# Patient Record
Sex: FEMALE | Race: WHITE | NOT HISPANIC OR LATINO | URBAN - METROPOLITAN AREA
[De-identification: names, ages, dates, MRNs, and addresses within clinical notes are randomized per-mention and may not be internally consistent; named-entity substitution may affect disease eponyms.]

---

## 2024-07-18 ENCOUNTER — LAB OUTSIDE AN ENCOUNTER (OUTPATIENT)
Dept: URBAN - METROPOLITAN AREA CLINIC 19 | Facility: CLINIC | Age: 24
End: 2024-07-18

## 2024-07-18 ENCOUNTER — OFFICE VISIT (OUTPATIENT)
Dept: URBAN - METROPOLITAN AREA CLINIC 19 | Facility: CLINIC | Age: 24
End: 2024-07-18
Payer: COMMERCIAL

## 2024-07-18 ENCOUNTER — DASHBOARD ENCOUNTERS (OUTPATIENT)
Age: 24
End: 2024-07-18

## 2024-07-18 VITALS
BODY MASS INDEX: 31.4 KG/M2 | WEIGHT: 177.2 LBS | SYSTOLIC BLOOD PRESSURE: 102 MMHG | TEMPERATURE: 98.6 F | HEIGHT: 63 IN | HEART RATE: 73 BPM | DIASTOLIC BLOOD PRESSURE: 64 MMHG

## 2024-07-18 DIAGNOSIS — K52.89 (LYMPHOCYTIC) MICROSCOPIC COLITIS: ICD-10-CM

## 2024-07-18 DIAGNOSIS — R11.2 NAUSEA AND VOMITING, UNSPECIFIED VOMITING TYPE: ICD-10-CM

## 2024-07-18 PROCEDURE — 99204 OFFICE O/P NEW MOD 45 MIN: CPT | Performed by: INTERNAL MEDICINE

## 2024-07-18 RX ORDER — PROMETHAZINE HYDROCHLORIDE 25 MG/1
1 TABLET AS NEEDED TABLET ORAL EVERY 6 HOURS
Qty: 40 TABLET | Refills: 1 | OUTPATIENT
Start: 2024-07-18 | End: 2024-08-07

## 2024-07-18 RX ORDER — ONDANSETRON 4 MG/1
1 TABLET ON THE TONGUE AND ALLOW TO DISSOLVE TABLET, ORALLY DISINTEGRATING ORAL
Qty: 40 | Refills: 3 | OUTPATIENT
Start: 2024-07-18

## 2024-07-18 RX ORDER — PANTOPRAZOLE SODIUM 40 MG/1
1 TABLET TABLET, DELAYED RELEASE ORAL ONCE A DAY
Status: ACTIVE | COMMUNITY

## 2024-07-18 NOTE — HPI-TODAY'S VISIT:
Patient, who is a  and has a history of anxiety, depression, and migraine headaches, presents as a referral from the [facility] ED, where she was seen on 5/28/24. At that time, she was complaining mainly of vomiting. She stated that she was waking up every morning with nausea/vomiting and diarrhea for over a month. It was worse when she would eat, and she associated it with abdominal pain. Labs, RUQ ultrasound, and abdominal x-ray were all unremarkable. She was given famotidine, viscous lidocaine, IV fluids, Maalox, and ondansetron in the ED and told to follow up with a gastroenterologist. She is taking pantoprazole 40 mg po daily and has noticed some improvement. However, she still has sporadic nausea and upper abdominal pain. Also has bowel urgency. Symptoms can last up to an hour. She does not associated these symptoms with her migraine headaches, but she has had more of these headaches lately. Of note, she described her bowel urgency purely as a feeling like she needs to go to the bathroom; she was not having loose stools and denied any blood or mucus in the stool. If anything, she is prone to constipation. She has some ondansetron, but it makes her sleepy - we agreed to try promethazine, although it is probably going to cause the same issue.

## 2024-07-19 LAB
ALBUMIN: 5
ALKALINE PHOSPHATASE: 60
ALT (SGPT): 11
AMYLASE: 53
AST (SGOT): 14
BILIRUBIN, TOTAL: 0.6
BUN/CREATININE RATIO: 10
BUN: 11
C-REACTIVE PROTEIN, QUANT: <1
CALCIUM: 10.3
CARBON DIOXIDE, TOTAL: 19
CHLORIDE: 102
CREATININE: 1.08
EGFR: 73
GLOBULIN, TOTAL: 2.2
GLUCOSE: 76
HEMATOCRIT: 42.9
HEMOGLOBIN: 13.6
LIPASE: 46
MCH: 28.5
MCHC: 31.7
MCV: 90
NRBC: (no result)
PLATELETS: 368
POTASSIUM: 4.4
PROTEIN, TOTAL: 7.2
RBC: 4.78
RDW: 13
SEDIMENTATION RATE-WESTERGREN: 28
SODIUM: 140
T4,FREE(DIRECT): 1.52
TSH: 3.27
WBC: 9

## 2024-07-25 LAB
DEAMIDATED GLIADIN ABS, IGA: 3
DEAMIDATED GLIADIN ABS, IGG: 2
IMMUNOGLOBULIN A, QN, SERUM: 155
T-TRANSGLUTAMINASE (TTG) IGA: <2
T-TRANSGLUTAMINASE (TTG) IGG: <2
WRITTEN AUTHORIZATION: (no result)

## 2024-08-02 ENCOUNTER — LAB OUTSIDE AN ENCOUNTER (OUTPATIENT)
Dept: URBAN - METROPOLITAN AREA CLINIC 19 | Facility: CLINIC | Age: 24
End: 2024-08-02

## 2024-08-07 ENCOUNTER — CLAIMS CREATED FROM THE CLAIM WINDOW (OUTPATIENT)
Dept: URBAN - METROPOLITAN AREA SURGERY CENTER 31 | Facility: SURGERY CENTER | Age: 24
End: 2024-08-07
Payer: COMMERCIAL

## 2024-08-07 DIAGNOSIS — R10.10 UPPER ABDOMINAL PAIN: ICD-10-CM

## 2024-08-07 DIAGNOSIS — R10.10 ABDOMINAL WALL PAIN IN RIGHT UPPER QUADRANT: ICD-10-CM

## 2024-08-07 PROCEDURE — 00731 ANES UPR GI NDSC PX NOS: CPT | Performed by: NURSE ANESTHETIST, CERTIFIED REGISTERED

## 2024-08-07 PROCEDURE — 43235 EGD DIAGNOSTIC BRUSH WASH: CPT | Performed by: INTERNAL MEDICINE

## 2024-08-07 RX ORDER — ONDANSETRON 4 MG/1
1 TABLET ON THE TONGUE AND ALLOW TO DISSOLVE TABLET, ORALLY DISINTEGRATING ORAL
Qty: 40 | Refills: 3 | Status: ACTIVE | COMMUNITY
Start: 2024-07-18

## 2024-08-07 RX ORDER — PANTOPRAZOLE SODIUM 40 MG/1
1 TABLET TABLET, DELAYED RELEASE ORAL ONCE A DAY
Status: ACTIVE | COMMUNITY

## 2024-08-07 RX ORDER — PROMETHAZINE HYDROCHLORIDE 25 MG/1
1 TABLET AS NEEDED TABLET ORAL EVERY 6 HOURS
Qty: 40 TABLET | Refills: 1 | Status: ACTIVE | COMMUNITY
Start: 2024-07-18 | End: 2024-08-07

## 2024-08-16 ENCOUNTER — OFFICE VISIT (OUTPATIENT)
Dept: URBAN - METROPOLITAN AREA CLINIC 19 | Facility: CLINIC | Age: 24
End: 2024-08-16

## 2024-11-05 ENCOUNTER — OFFICE VISIT (OUTPATIENT)
Dept: URBAN - METROPOLITAN AREA CLINIC 19 | Facility: CLINIC | Age: 24
End: 2024-11-05
Payer: COMMERCIAL

## 2024-11-05 VITALS
DIASTOLIC BLOOD PRESSURE: 76 MMHG | HEIGHT: 63 IN | OXYGEN SATURATION: 100 % | WEIGHT: 179 LBS | HEART RATE: 70 BPM | SYSTOLIC BLOOD PRESSURE: 105 MMHG | BODY MASS INDEX: 31.71 KG/M2 | TEMPERATURE: 99.3 F

## 2024-11-05 DIAGNOSIS — R12 HEARTBURN: ICD-10-CM

## 2024-11-05 DIAGNOSIS — R11.2 NAUSEA AND VOMITING, UNSPECIFIED VOMITING TYPE: ICD-10-CM

## 2024-11-05 DIAGNOSIS — Z90.49 S/P CHOLECYSTECTOMY: ICD-10-CM

## 2024-11-05 PROCEDURE — 99213 OFFICE O/P EST LOW 20 MIN: CPT | Performed by: NURSE PRACTITIONER

## 2024-11-05 RX ORDER — ONDANSETRON 4 MG/1
1 TABLET ON THE TONGUE AND ALLOW TO DISSOLVE TABLET, ORALLY DISINTEGRATING ORAL
Qty: 40 | Refills: 3 | Status: ACTIVE | COMMUNITY
Start: 2024-07-18

## 2024-11-05 RX ORDER — PANTOPRAZOLE SODIUM 40 MG/1
1 TABLET TABLET, DELAYED RELEASE ORAL ONCE A DAY
Qty: 90 TABLET | Refills: 1 | OUTPATIENT
Start: 2024-11-05

## 2024-11-05 RX ORDER — ACETAMINOPHEN 325 MG/1
1 TABLET AS NEEDED TABLET, FILM COATED ORAL
Status: ACTIVE | COMMUNITY

## 2024-11-05 RX ORDER — ONDANSETRON 4 MG/1
1 TABLET ON THE TONGUE AND ALLOW TO DISSOLVE TABLET, ORALLY DISINTEGRATING ORAL
Qty: 40 | Refills: 3
Start: 2024-07-18

## 2024-11-05 RX ORDER — PANTOPRAZOLE SODIUM 40 MG/1
1 TABLET TABLET, DELAYED RELEASE ORAL ONCE A DAY
Status: ACTIVE | COMMUNITY

## 2024-11-05 NOTE — PHYSICAL EXAM GASTROINTESTINAL
Abdomen soft, nontender, nondistended, slight bruising to mid lower abdomen, incisions intact, no guarding or rigidity, no hepatosplenomegaly

## 2024-11-05 NOTE — HPI-TODAY'S VISIT:
7/18/2024 (Dr. Lilly): Patient, who is a  and has a history of anxiety, depression, and migraine headaches, presents as a referral from the [facility] ED, where she was seen on 5/28/24. At that time, she was complaining mainly of vomiting. She stated that she was waking up every morning with nausea/vomiting and diarrhea for over a month. It was worse when she would eat, and she associated it with abdominal pain. Labs, RUQ ultrasound, and abdominal x-ray were all unremarkable. She was given famotidine, viscous lidocaine, IV fluids, Maalox, and ondansetron in the ED and told to follow up with a gastroenterologist. She is taking pantoprazole 40 mg po daily and has noticed some improvement. However, she still has sporadic nausea and upper abdominal pain. Also has bowel urgency. Symptoms can last up to an hour. She does not associated these symptoms with her migraine headaches, but she has had more of these headaches lately. Of note, she described her bowel urgency purely as a feeling like she needs to go to the bathroom; she was not having loose stools and denied any blood or mucus in the stool. If anything, she is prone to constipation. She has some ondansetron, but it makes her sleepy - we agreed to try promethazine, although it is probably going to cause the same issue.   Labs: CBC normal CMP unremarkable, TSH and free T4 normal celiac disease panel negative, CRP, lipase, amylase, ESR all normal   HIDA 8/2/2024 abnormal, EF 21% referred to Dr. Abeba Espinoza   8/7/2024 EGD with Dr. Lilly was normal.  No specimens collected   11/5/2024 (SAHIL Teixeira): She reports she underwent lap su with Dr. Espinoza on 11/1/2024. Had been taking oxycodone, makes her feel nauseated, worse during sleep with or without the pain med. Has f/u with her 11/14/2024. Has zofran to take PRN. And feels an overall discomfort. Able to eat again. LBM was this morning. Taking colace. Incisions healing well. Reports intermittent phantom pains to sides of her umbilicus. Continues on Pantoprazole. IBgard also helps her a lot.

## 2024-11-05 NOTE — PHYSICAL EXAM CONSTITUTIONAL:
Well developed, well nourished, in no acute distress, ambulating without difficulty, normal communication ability.

## 2025-02-18 ENCOUNTER — OFFICE VISIT (OUTPATIENT)
Dept: URBAN - METROPOLITAN AREA CLINIC 19 | Facility: CLINIC | Age: 25
End: 2025-02-18
Payer: COMMERCIAL

## 2025-02-18 VITALS
OXYGEN SATURATION: 98 % | WEIGHT: 170 LBS | SYSTOLIC BLOOD PRESSURE: 130 MMHG | TEMPERATURE: 97 F | HEART RATE: 110 BPM | BODY MASS INDEX: 30.12 KG/M2 | DIASTOLIC BLOOD PRESSURE: 80 MMHG | HEIGHT: 63 IN

## 2025-02-18 DIAGNOSIS — R11.2 NAUSEA AND VOMITING, UNSPECIFIED VOMITING TYPE: ICD-10-CM

## 2025-02-18 DIAGNOSIS — K90.89 BILE SALT-INDUCED DIARRHEA: ICD-10-CM

## 2025-02-18 DIAGNOSIS — K52.9 CHRONIC DIARRHEA: ICD-10-CM

## 2025-02-18 PROCEDURE — 99214 OFFICE O/P EST MOD 30 MIN: CPT | Performed by: INTERNAL MEDICINE

## 2025-02-18 RX ORDER — PANTOPRAZOLE SODIUM 40 MG/1
1 TABLET TABLET, DELAYED RELEASE ORAL ONCE A DAY
Qty: 90 TABLET | Refills: 1 | Status: DISCONTINUED | COMMUNITY
Start: 2024-11-05

## 2025-02-18 RX ORDER — PANTOPRAZOLE SODIUM 40 MG/1
1 TABLET TABLET, DELAYED RELEASE ORAL ONCE A DAY
Status: DISCONTINUED | COMMUNITY

## 2025-02-18 RX ORDER — ACETAMINOPHEN 325 MG/1
1 TABLET AS NEEDED TABLET, FILM COATED ORAL
Status: ACTIVE | COMMUNITY

## 2025-02-18 RX ORDER — ONDANSETRON 4 MG/1
1 TABLET ON THE TONGUE AND ALLOW TO DISSOLVE TABLET, ORALLY DISINTEGRATING ORAL
Qty: 40 | Refills: 3 | Status: ACTIVE | COMMUNITY
Start: 2024-07-18

## 2025-02-18 RX ORDER — CHOLESTYRAMINE 4 G/9G
1 PACKET MIXED WITH WATER OR NON-CARBONATED DRINK POWDER, FOR SUSPENSION ORAL ONCE A DAY
Qty: 30 | OUTPATIENT
Start: 2025-02-18

## 2025-02-18 NOTE — HPI-TODAY'S VISIT:
7/18/2024 (Dr. Dhiraj Lilly): Patient, who is a  and has a history of anxiety, depression, and migraine headaches, presents as a referral from the [facility] ED, where she was seen on 5/28/24. At that time, she was complaining mainly of vomiting. She stated that she was waking up every morning with nausea/vomiting and diarrhea for over a month. It was worse when she would eat, and she associated it with abdominal pain. Labs, RUQ ultrasound, and abdominal x-ray were all unremarkable. She was given famotidine, viscous lidocaine, IV fluids, Maalox, and ondansetron in the ED and told to follow up with a gastroenterologist. She is taking pantoprazole 40 mg po daily and has noticed some improvement. However, she still has sporadic nausea and upper abdominal pain. Also has bowel urgency. Symptoms can last up to an hour. She does not associated these symptoms with her migraine headaches, but she has had more of these headaches lately. Of note, she described her bowel urgency purely as a feeling like she needs to go to the bathroom; she was not having loose stools and denied any blood or mucus in the stool. If anything, she is prone to constipation. She has some ondansetron, but it makes her sleepy - we agreed to try promethazine, although it is probably going to cause the same issue.  Labs: CBC normal CMP unremarkable, TSH and free T4 normal celiac disease panel negative, CRP, lipase, amylase, ESR all normal  HIDA 8/2/2024 abnormal, EF 21% referred to Dr. Abeba Espinoza   8/7/2024 EGD with Dr. Lilly was normal.  No specimens collected  11/5/2024 (Lluvia Echavarria NP): She reports she underwent lap su with Dr. Espinoza on 11/1/2024. Had been taking oxycodone, makes her feel nauseated, worse during sleep with or without the pain med. Has f/u with her 11/14/2024. Has zofran to take PRN. And feels an overall discomfort. Able to eat again. LBM was this morning. Taking colace. Incisions healing well. Reports intermittent phantom pains to sides of her umbilicus. Continues on Pantoprazole. IBgard also helps her a lot.  2/18/25 (Dr. Dhiraj Lilly):  Patient presents for urgent consultation. As noted above, she had a cholecystectomy by Dr. Abeba Espinoza for biliary dyskinesia. She continues to have issues with nausea/vomiting and lack of appetite. She has a gagging sensation and is sensitive to meals. She has had some loose stools for the past 2 weeks along with dizziness. She admits to high anxiety/depression symptoms with some "brain fog" and hot flashes (?).  Patient admits that a lot of her symptoms are related to her stress, particularly when she is around people. She had severe gagging/retching around Thanksgiving, Kya, and New Year's. But she also has intermittent symptoms even when she is not around people. We discussed the need for her to see a counselor about her stress and a neurologist about her migraine headaches, which are better but not always controlled.

## 2025-02-19 LAB
(TTG) AB, IGA: <1
(TTG) AB, IGG: <1
ABSOLUTE BASOPHILS: 86
ABSOLUTE EOSINOPHILS: 76
ABSOLUTE LYMPHOCYTES: 2009
ABSOLUTE MONOCYTES: 745
ABSOLUTE NEUTROPHILS: 7884
ALBUMIN/GLOBULIN RATIO: 2.1
ALBUMIN: 5.5
ALKALINE PHOSPHATASE: 49
ALT: 41
ANTIGLIADIN ABS, IGA: <1
AST: 21
BASOPHILS: 0.8
BILIRUBIN, TOTAL: 0.8
BUN/CREATININE RATIO: 12
C-REACTIVE PROTEIN, QUANT: <3
CALCIUM: 10.5
CARBON DIOXIDE: 20
CHLORIDE: 103
CREATININE: 1.06
EOSINOPHILS: 0.7
GLIADIN (DEAMIDATED) AB (IGG): <1
GLOBULIN: 2.6
GLUCOSE: 76
HEMATOCRIT: 44.7
HEMOGLOBIN: 14.7
IMMUNOGLOBULIN A: 163
LYMPHOCYTES: 18.6
MCH: 28.5
MCHC: 32.9
MCV: 86.8
MONOCYTES: 6.9
MPV: 12.3
NEUTROPHILS: 73
PLATELET COUNT: 416
POTASSIUM: 3.9
PROTEIN, TOTAL: 8.1
RDW: 12.2
RED BLOOD CELL COUNT: 5.15
SED RATE BY MODIFIED: 6
SODIUM: 140
UREA NITROGEN (BUN): 13
WHITE BLOOD CELL COUNT: 10.8

## 2025-02-22 ENCOUNTER — TELEPHONE ENCOUNTER (OUTPATIENT)
Dept: URBAN - METROPOLITAN AREA CLINIC 19 | Facility: CLINIC | Age: 25
End: 2025-02-22

## 2025-02-22 ENCOUNTER — LAB OUTSIDE AN ENCOUNTER (OUTPATIENT)
Dept: URBAN - METROPOLITAN AREA CLINIC 19 | Facility: CLINIC | Age: 25
End: 2025-02-22

## 2025-03-03 ENCOUNTER — LAB OUTSIDE AN ENCOUNTER (OUTPATIENT)
Dept: URBAN - METROPOLITAN AREA CLINIC 19 | Facility: CLINIC | Age: 25
End: 2025-03-03

## 2025-03-12 ENCOUNTER — TELEPHONE ENCOUNTER (OUTPATIENT)
Dept: URBAN - METROPOLITAN AREA CLINIC 19 | Facility: CLINIC | Age: 25
End: 2025-03-12

## 2025-03-14 ENCOUNTER — TELEPHONE ENCOUNTER (OUTPATIENT)
Dept: URBAN - METROPOLITAN AREA CLINIC 19 | Facility: CLINIC | Age: 25
End: 2025-03-14

## 2025-03-14 ENCOUNTER — LAB OUTSIDE AN ENCOUNTER (OUTPATIENT)
Dept: URBAN - METROPOLITAN AREA CLINIC 19 | Facility: CLINIC | Age: 25
End: 2025-03-14

## 2025-03-16 ENCOUNTER — ERX REFILL RESPONSE (OUTPATIENT)
Dept: URBAN - METROPOLITAN AREA CLINIC 19 | Facility: CLINIC | Age: 25
End: 2025-03-16

## 2025-03-16 RX ORDER — CHOLESTYRAMINE 4 G/9G
1 PACKET MIXED WITH WATER OR NON-CARBONATED DRINK POWDER, FOR SUSPENSION ORAL ONCE A DAY
Qty: 30 | OUTPATIENT

## 2025-03-16 RX ORDER — CHOLESTYRAMINE 4 G/9G
TAKE 1 PACKET MIXED WITH WATER OR NON-CARBONATED DRINK BY MOUTH ONCE A DAY POWDER, FOR SUSPENSION ORAL
Qty: 90 PACKET | Refills: 1 | OUTPATIENT

## 2025-04-15 ENCOUNTER — OFFICE VISIT (OUTPATIENT)
Dept: URBAN - METROPOLITAN AREA CLINIC 19 | Facility: CLINIC | Age: 25
End: 2025-04-15
Payer: COMMERCIAL

## 2025-04-15 DIAGNOSIS — K90.89 BILE SALT-INDUCED DIARRHEA: ICD-10-CM

## 2025-04-15 DIAGNOSIS — R11.2 NAUSEA AND VOMITING, UNSPECIFIED VOMITING TYPE: ICD-10-CM

## 2025-04-15 DIAGNOSIS — K76.0 FATTY LIVER: ICD-10-CM

## 2025-04-15 PROCEDURE — 99214 OFFICE O/P EST MOD 30 MIN: CPT | Performed by: NURSE PRACTITIONER

## 2025-04-15 RX ORDER — ACETAMINOPHEN 325 MG/1
1 TABLET AS NEEDED TABLET, FILM COATED ORAL
Status: ACTIVE | COMMUNITY

## 2025-04-15 RX ORDER — COLESEVELAM HYDROCHLORIDE 625 MG/1
3 TABLETS WITH MEALS TABLET, COATED ORAL TWICE A DAY
Qty: 180 | Refills: 1 | OUTPATIENT
Start: 2025-04-15

## 2025-04-15 RX ORDER — PROCHLORPERAZINE 25 MG/1
1 TABLET SUPPOSITORY RECTAL
Qty: 40 TABLET | Refills: 1 | OUTPATIENT
Start: 2025-04-15 | End: 2025-05-05

## 2025-04-15 RX ORDER — ONDANSETRON 4 MG/1
1 TABLET ON THE TONGUE AND ALLOW TO DISSOLVE TABLET, ORALLY DISINTEGRATING ORAL
Qty: 40 | Refills: 3 | Status: ACTIVE | COMMUNITY
Start: 2024-07-18

## 2025-04-15 RX ORDER — CHOLESTYRAMINE 4 G/9G
TAKE 1 PACKET MIXED WITH WATER OR NON-CARBONATED DRINK BY MOUTH ONCE A DAY POWDER, FOR SUSPENSION ORAL
Qty: 90 PACKET | Refills: 1 | Status: ON HOLD | COMMUNITY

## 2025-04-15 NOTE — HPI-TODAY'S VISIT:
7/18/2024 (Dr. Dhiraj Lilly): Patient, who is a  and has a history of anxiety, depression, and migraine headaches, presents as a referral from the [facility] ED, where she was seen on 5/28/24. At that time, she was complaining mainly of vomiting. She stated that she was waking up every morning with nausea/vomiting and diarrhea for over a month. It was worse when she would eat, and she associated it with abdominal pain. Labs, RUQ ultrasound, and abdominal x-ray were all unremarkable. She was given famotidine, viscous lidocaine, IV fluids, Maalox, and ondansetron in the ED and told to follow up with a gastroenterologist. She is taking pantoprazole 40 mg po daily and has noticed some improvement. However, she still has sporadic nausea and upper abdominal pain. Also has bowel urgency. Symptoms can last up to an hour. She does not associated these symptoms with her migraine headaches, but she has had more of these headaches lately. Of note, she described her bowel urgency purely as a feeling like she needs to go to the bathroom; she was not having loose stools and denied any blood or mucus in the stool. If anything, she is prone to constipation. She has some ondansetron, but it makes her sleepy - we agreed to try promethazine, although it is probably going to cause the same issue.  Labs: CBC normal CMP unremarkable, TSH and free T4 normal celiac disease panel negative, CRP, lipase, amylase, ESR all normal  HIDA 8/2/2024 abnormal, EF 21% referred to Dr. Abeba Espinoza  8/7/2024 EGD with Dr. Lilly was normal. No specimens collected  11/5/2024 (Lluvia Echavarria NP): She reports she underwent lap su with Dr. Espinoza on 11/1/2024. Had been taking oxycodone, makes her feel nauseated, worse during sleep with or without the pain med. Has f/u with her 11/14/2024. Has zofran to take PRN. And feels an overall discomfort. Able to eat again. LBM was this morning. Taking colace. Incisions healing well. Reports intermittent phantom pains to sides of her umbilicus. Continues on Pantoprazole. IBgard also helps her a lot.  2/18/25 (Dr. Dhiraj Lilly): Patient presents for urgent consultation. As noted above, she had a cholecystectomy by Dr. Abeba Espinoza for biliary dyskinesia. She continues to have issues with nausea/vomiting and lack of appetite. She has a gagging sensation and is sensitive to meals. She has had some loose stools for the past 2 weeks along with dizziness. She admits to high anxiety/depression symptoms with some "brain fog" and hot flashes (?).  Patient admits that a lot of her symptoms are related to her stress, particularly when she is around people. She had severe gagging/retching around Thanksgiving, Kya, and New Year's. But she also has intermittent symptoms even when she is not around people. We discussed the need for her to see a counselor about her stress and a neurologist about her migraine headaches, which are better but not always controlled.    3/3/2025 RUQ US: Echogenic lesion in the left liver is indeterminate  may represent a hemangioma.  Consider abdominal MRI.  Nonobstructing right nephrolithiasis.  Tiny echogenic focus in the right kidney may represent a tiny angiomyolipoma  04/22/2025 MRI liver: There is some mild focal fatty infiltration along the anterior falciform ligament likely corresponding to the echogenic focus on ultrasound.  No suspicious hepatic lesions   4/15/2025 (SAHIL Teixeira): She c/o ongoing nausea with intemittent vomiting. Also c/o questran. Tastes awful, takes an hour to get it down, she is unsure if it works or not, moment she swallows it causes her nausea and wants to vomit and reports it triggers reflux. Otherwise does not have significant reflux. Stopped it a week ago and has not noticed much difference. Describes stool as soft like sausage about once/day. When she is sick can be mutlitple times/day. Wants to know what other equivalent she can take so she does not have to take the powder. Wants to do for nausea as she can not tolerate zofran or promethazine. Vapes Delta-8 recommended by her Psych Takes excedrin migraine once every few weeks. Denies ETOH.

## 2025-04-16 LAB
A/G RATIO: 2
ALBUMIN: 4.5
ALKALINE PHOSPHATASE: 56
ALT (SGPT): 66
AST (SGOT): 26
BILIRUBIN, TOTAL: 0.4
BUN/CREATININE RATIO: 11
BUN: 11
CALCIUM: 9.6
CARBON DIOXIDE, TOTAL: 26
CHLORIDE: 107
CREATININE: 1.04
EGFR: 76
GLOBULIN, TOTAL: 2.2
GLUCOSE: 92
HEMATOCRIT: 42.6
HEMOGLOBIN: 13.8
INR: 1.1
MCH: 28.3
MCHC: 32.4
MCV: 87.5
MPV: 12.8
PLATELET COUNT: 370
POTASSIUM: 4.4
PROTEIN, TOTAL: 6.7
PT: 11.3
RDW: 12.8
RED BLOOD CELL COUNT: 4.87
SODIUM: 140
WHITE BLOOD CELL COUNT: 5.8

## 2025-04-22 ENCOUNTER — TELEPHONE ENCOUNTER (OUTPATIENT)
Dept: URBAN - METROPOLITAN AREA CLINIC 19 | Facility: CLINIC | Age: 25
End: 2025-04-22

## 2025-04-22 RX ORDER — COLESEVELAM HYDROCHLORIDE 625 MG/1
3 TABLETS WITH MEALS TABLET, COATED ORAL TWICE A DAY
Qty: 180 | Refills: 1
Start: 2025-04-15

## 2025-04-22 RX ORDER — PROCHLORPERAZINE 25 MG/1
1 TABLET SUPPOSITORY RECTAL
Qty: 40 TABLET | Refills: 1
Start: 2025-04-15 | End: 2025-05-12

## 2025-04-24 ENCOUNTER — TELEPHONE ENCOUNTER (OUTPATIENT)
Dept: URBAN - METROPOLITAN AREA CLINIC 19 | Facility: CLINIC | Age: 25
End: 2025-04-24

## 2025-04-30 ENCOUNTER — LAB OUTSIDE AN ENCOUNTER (OUTPATIENT)
Dept: URBAN - METROPOLITAN AREA CLINIC 19 | Facility: CLINIC | Age: 25
End: 2025-04-30

## 2025-04-30 ENCOUNTER — OFFICE VISIT (OUTPATIENT)
Dept: URBAN - METROPOLITAN AREA CLINIC 19 | Facility: CLINIC | Age: 25
End: 2025-04-30

## 2025-04-30 RX ORDER — ACETAMINOPHEN 325 MG/1
1 TABLET AS NEEDED TABLET, FILM COATED ORAL
Status: DISCONTINUED | COMMUNITY

## 2025-04-30 RX ORDER — ONDANSETRON 4 MG/1
1 TABLET ON THE TONGUE AND ALLOW TO DISSOLVE TABLET, ORALLY DISINTEGRATING ORAL
Qty: 40 | Refills: 3 | Status: ACTIVE | COMMUNITY
Start: 2024-07-18

## 2025-04-30 RX ORDER — CHOLESTYRAMINE 4 G/9G
TAKE 1 PACKET MIXED WITH WATER OR NON-CARBONATED DRINK BY MOUTH ONCE A DAY POWDER, FOR SUSPENSION ORAL
Qty: 90 PACKET | Refills: 1 | Status: DISCONTINUED | COMMUNITY

## 2025-04-30 RX ORDER — COLESEVELAM HYDROCHLORIDE 625 MG/1
3 TABLETS WITH MEALS TABLET, COATED ORAL TWICE A DAY
Qty: 180 | Refills: 1 | Status: DISCONTINUED | COMMUNITY
Start: 2025-04-15

## 2025-04-30 RX ORDER — PROCHLORPERAZINE 25 MG/1
1 TABLET SUPPOSITORY RECTAL
Qty: 40 TABLET | Refills: 1 | Status: DISCONTINUED | COMMUNITY
Start: 2025-04-15 | End: 2025-05-12

## 2025-04-30 RX ORDER — SUCRALFATE 1 G/1
1 TABLET ON AN EMPTY STOMACH TABLET ORAL TWICE A DAY
Status: ACTIVE | COMMUNITY

## 2025-04-30 NOTE — HPI-TODAY'S VISIT:
Magui is a 25-year-old female with history of anxiety/depression, PTSD, and migraines who returns today for ER follow-up.  Last seen in clinic by SAHIL Teixeira on 4/15/2025 with ongoing nausea and intermittent vomiting.  At that time she reported her psychiatrist recommended to vape delta 8.  She was given Compazine 5 mg as needed for nausea, recommended to refrain from smoking delta 8 (given cannabinoid hyperemesis syndrome handout) and started on Colesevelam for treatment of diarrhea.  She presented to [facility] ER on 4/23/2025 with intermittent bouts of epigastric pain and nausea ongoing for a couple of months and worsening over the past couple of days.  CBC was unremarkable, creatinine 1.1 albumin 5.5, ALT 64.  RUQ ultrasound noted a previous CCY otherwise unremarkable.  She was given a prescription for Carafate, antiemetics and recommended to follow outpatient.  Reports epigastric tightening/pain causes her  to "violently vomit". She typically gets nauseous by noon and realized greasy/oily foods worsens symptom so she avoids them. No longer vaping Delta 8 since last office visit. Having [place] 4 to 7 stools.   Previous history summarized below: [facility] ED on 5/28/2024.  With complaints of vomiting. Labs, RUQ ultrasound, and abdominal x-ray were all unremarkable. She was given famotidine, viscous lidocaine, IV fluids, Maalox, and ondansetron in the ED. HIDA 8/2/2024 abnormal, EF 21% referred to Dr. Abeba Espinoza. Lap su on 11/1/2024 with Dr. Espinoza. EGD was on 8/2024 and was unremarkable with no specimens collected. RUQ US 3/3/2025 Echogenic lesion in the left liver is indeterminate may represent a hemangioma. Consider abdominal MRI. Nonobstructing right nephrolithiasis. Tiny echogenic focus in the right kidney may represent a tiny angiomyolipoma. MRI liver 04/22/2025: There is some mild focal fatty infiltration along the anterior falciform ligament likely corresponding to the echogenic focus on ultrasound. No suspicious hepatic lesions.

## 2025-05-09 ENCOUNTER — CLAIMS CREATED FROM THE CLAIM WINDOW (OUTPATIENT)
Dept: URBAN - METROPOLITAN AREA SURGERY CENTER 31 | Facility: SURGERY CENTER | Age: 25
End: 2025-05-09
Payer: COMMERCIAL

## 2025-05-09 DIAGNOSIS — K29.80 DUODENITIS: ICD-10-CM

## 2025-05-09 DIAGNOSIS — R11.2 ACUTE NAUSEA WITH NONBILIOUS VOMITING: ICD-10-CM

## 2025-05-09 DIAGNOSIS — R63.4 ABNORMAL INTENTIONAL WEIGHT LOSS: ICD-10-CM

## 2025-05-09 DIAGNOSIS — K29.70 GASTRITIS WITHOUT BLEEDING, UNSPECIFIED CHRONICITY, UNSPECIFIED GASTRITIS TYPE: ICD-10-CM

## 2025-05-09 DIAGNOSIS — K29.80 ACUTE DUODENITIS: ICD-10-CM

## 2025-05-09 DIAGNOSIS — F32.A DEPRESSION, UNSPECIFIED: ICD-10-CM

## 2025-05-09 DIAGNOSIS — F41.9 ANXIETY DISORDER, UNSPECIFIED: ICD-10-CM

## 2025-05-09 DIAGNOSIS — K29.60 ADENOPAPILLOMATOSIS GASTRICA: ICD-10-CM

## 2025-05-09 PROCEDURE — 00731 ANES UPR GI NDSC PX NOS: CPT | Performed by: NURSE ANESTHETIST, CERTIFIED REGISTERED

## 2025-05-09 PROCEDURE — 43239 EGD BIOPSY SINGLE/MULTIPLE: CPT | Performed by: STUDENT IN AN ORGANIZED HEALTH CARE EDUCATION/TRAINING PROGRAM

## 2025-05-09 RX ORDER — SUCRALFATE 1 G/1
1 TABLET ON AN EMPTY STOMACH TABLET ORAL TWICE A DAY
Status: ACTIVE | COMMUNITY

## 2025-05-09 RX ORDER — ONDANSETRON 4 MG/1
1 TABLET ON THE TONGUE AND ALLOW TO DISSOLVE TABLET, ORALLY DISINTEGRATING ORAL
Qty: 40 | Refills: 3 | Status: ACTIVE | COMMUNITY
Start: 2024-07-18

## 2025-05-14 ENCOUNTER — LAB OUTSIDE AN ENCOUNTER (OUTPATIENT)
Dept: URBAN - METROPOLITAN AREA CLINIC 19 | Facility: CLINIC | Age: 25
End: 2025-05-14

## 2025-05-18 ENCOUNTER — ERX REFILL RESPONSE (OUTPATIENT)
Dept: URBAN - METROPOLITAN AREA CLINIC 19 | Facility: CLINIC | Age: 25
End: 2025-05-18

## 2025-05-18 RX ORDER — COLESEVELAM HYDROCHLORIDE 625 MG/1
3 TABLETS WITH MEALS TABLET, COATED ORAL TWICE A DAY
Qty: 540 TABLET | Refills: 3 | OUTPATIENT

## 2025-05-19 ENCOUNTER — TELEPHONE ENCOUNTER (OUTPATIENT)
Dept: URBAN - METROPOLITAN AREA CLINIC 19 | Facility: CLINIC | Age: 25
End: 2025-05-19

## 2025-05-30 ENCOUNTER — OFFICE VISIT (OUTPATIENT)
Dept: URBAN - METROPOLITAN AREA CLINIC 19 | Facility: CLINIC | Age: 25
End: 2025-05-30
Payer: COMMERCIAL

## 2025-05-30 DIAGNOSIS — K76.0 FATTY LIVER: ICD-10-CM

## 2025-05-30 DIAGNOSIS — K29.90 GASTRITIS AND DUODENITIS: ICD-10-CM

## 2025-05-30 DIAGNOSIS — K31.84 GASTROPARESIS: ICD-10-CM

## 2025-05-30 PROCEDURE — 99214 OFFICE O/P EST MOD 30 MIN: CPT

## 2025-05-30 RX ORDER — SUCRALFATE 1 G/1
1 TABLET ON AN EMPTY STOMACH TABLET ORAL TWICE A DAY
Status: ACTIVE | COMMUNITY

## 2025-05-30 RX ORDER — COLESEVELAM HYDROCHLORIDE 625 MG/1
3 TABLETS WITH MEALS TABLET, COATED ORAL TWICE A DAY
Qty: 540 TABLET | Refills: 3 | Status: ACTIVE | COMMUNITY

## 2025-05-30 RX ORDER — PANTOPRAZOLE SODIUM 20 MG/1
1 TABLET 30 MINUTES BEFORE BREAKFAST TABLET, DELAYED RELEASE ORAL ONCE A DAY
Qty: 60 | Refills: 0 | OUTPATIENT
Start: 2025-05-29

## 2025-05-30 RX ORDER — FAMOTIDINE 20 MG/1
TAKE 1 TABLET TABLET, FILM COATED ORAL ONCE A DAY
Qty: 90 | Refills: 5 | OUTPATIENT
Start: 2025-05-29

## 2025-05-30 RX ORDER — ONDANSETRON 4 MG/1
1 TABLET ON THE TONGUE AND ALLOW TO DISSOLVE TABLET, ORALLY DISINTEGRATING ORAL
Qty: 40 | Refills: 3 | Status: ACTIVE | COMMUNITY
Start: 2024-07-18

## 2025-05-30 NOTE — HPI-TODAY'S VISIT:
Ms. Tim returns today for follow-up after her EGD.  Last seen on 4/30/2025 for ER follow-up with intermittent bouts of epigastric pain, nausea and vomiting.  At that time she reported no longer vaping delta 8 and weight loss of almost 20 pounds in 15 days.  5/9/2025 EGD with Dr. Steen noted a small hiatal hernia and erythematous mucosa in the gastric antrum and body.  Stomach biopsy with chronic active gastritis and negative for H. pylori.  Duodenal biopsy consistent with chronic duodenitis and negative for celiac sprue.  On 5/15/2025 [facility] noted mild to moderately delayed solid gastric emptying study.  She is feeling better. Her pain has subsided since she started a GF diet and smaller portions. No longer having nausea or vomiting.  [facility] below[facility] with complaints of vomiting. Labs, RUQ ultrasound, and abdominal x-ray were all unremarkable. She was given famotidine, viscous lidocaine, IV fluids, Maalox, and ondansetron. HIDA 8/2/2024 abnormal, EF 21% referred to Dr. Abeba Espinoza. Lap su on 11/1/2024 with Dr. Espinoza. EGD was on 8/2024 and was unremarkable with no specimens collected. RUQ US 3/3/2025 Echogenic lesion in the left liver is indeterminate may represent a hemangioma. Consider abdominal MRI. Nonobstructing right nephrolithiasis. Tiny echogenic focus in the right kidney may represent a tiny angiomyolipoma. MRI liver 04/22/2025: There is some mild focal fatty infiltration along the anterior falciform ligament likely corresponding to the echogenic focus on ultrasound. No suspicious hepatic lesions.

## 2025-06-20 ENCOUNTER — OFFICE VISIT (OUTPATIENT)
Dept: URBAN - METROPOLITAN AREA CLINIC 98 | Facility: CLINIC | Age: 25
End: 2025-06-20